# Patient Record
Sex: MALE | Race: WHITE
[De-identification: names, ages, dates, MRNs, and addresses within clinical notes are randomized per-mention and may not be internally consistent; named-entity substitution may affect disease eponyms.]

---

## 2017-02-12 ENCOUNTER — HOSPITAL ENCOUNTER (EMERGENCY)
Dept: HOSPITAL 25 - UCEAST | Age: 37
Discharge: HOME | End: 2017-02-12
Payer: COMMERCIAL

## 2017-02-12 VITALS — DIASTOLIC BLOOD PRESSURE: 84 MMHG | SYSTOLIC BLOOD PRESSURE: 120 MMHG

## 2017-02-12 DIAGNOSIS — F17.210: ICD-10-CM

## 2017-02-12 DIAGNOSIS — B34.9: ICD-10-CM

## 2017-02-12 DIAGNOSIS — J40: ICD-10-CM

## 2017-02-12 DIAGNOSIS — J32.9: Primary | ICD-10-CM

## 2017-02-12 PROCEDURE — 99212 OFFICE O/P EST SF 10 MIN: CPT

## 2017-02-12 PROCEDURE — G0463 HOSPITAL OUTPT CLINIC VISIT: HCPCS

## 2017-02-12 NOTE — UC
Respiratory Complaint HPI





- HPI Summary


HPI Summary: 





The patient comes in today for:





1.  Body aches, cough:





Onset: 2 days ago.  


Palliative/provocative:  Dayquil with questionable help. Nothing helps. 


Quality: Ache  


Region/radiation: Bilateral lower back and legs.


Severity:7/10 though he appears to be more like 4/10


Time: Constant aches, but cough lasts a few seconds. 


Associated symptoms:


   Fevers: no temperature taken.


   Rhinitis:  Green material


   Cough:  Green material.


   Chest pain: None at this time.


   Dyspnea: None. 





*





- History of Current Complaint


Chief Complaint: UCRespiratory


Stated Complaint: URI


Time Seen by Provider: 02/12/17 22:05


Hx Obtained From: Patient





- Allergies/Home Medications


Allergies/Adverse Reactions: 


 Allergies











Allergy/AdvReac Type Severity Reaction Status Date / Time


 


No Known Allergies Allergy   Verified 06/08/16 18:13











Home Medications: 


 Home Medications





Dextromethorphan-Phenylephrine [Daytime Cold & Flu Relief 10-5-325 mg] 1 cap PO 

02/12/17 [History]











PMH/Surg Hx/FS Hx/Imm Hx


Previously Healthy: Yes


Endocrine History Of: 


   Denies: Diabetes, Thyroid Disease, Hyperthyroidism, Hypothyroidism, 

Dyslipidemia


Cardiovascular History Of: 


   Denies: Cardiac Disorders, Hypertension, Pacemaker/ICD, Myocardial Infarction

, Congestive Heart Failure, Atrial Fibrillation, Deep Vein Thrombosis, Bleeding 

Disorders


Respiratory History Of: 


   Denies: COPD, Asthma, Bronchitis, Pneumonia, Pulmonary Embolism


GI/ History Of: 


   Denies: Gastroesophageal Reflux, Ulcer, Gastrointestinal Bleed, Gall Bladder 

Disease, Kidney Stones, Diverticulitis, Renal Disease, Urosepsis


Neurological History Of: 


   Denies: TIA, CVA, Dementia, Seizures, Migraine


Psychological History Of: 


   Denies: Anxiety, Depression, Bipolar Disorder, Schizophrenia, Post Traumatic 

Stress Disorder


Cancer History Of: 


   Denies: Lung Cancer, Colorectal Cancer, Breast Cancer, Prostate Cancer, 

Cervical Cancer


Other History Of: 


   Negative For: HIV, Hepatitis B, Hepatitis C, Anticoagulant Therapy





- Surgical History


Surgical History: Yes


Surgery Procedure, Year, and Place: vastectomy





- Family History


Known Family History: 


   Negative: Cardiac Disease, Hypertension





- Social History


Occupation: Employed Full-time


Lives: With Family


Alcohol Use: None


Substance Use Type: None


Smoking Status (MU): Light Every Day Tobacco Smoker


Type: Cigarettes


Amount Used/How Often: 1/3 pack a day


Length of Time of Smoking/Using Tobacco: 15 years





Review of Systems


Constitutional: Negative


Skin: Negative


Eyes: Negative


ENT: Nasal Discharge


Respiratory: Cough


Cardiovascular: Negative


Gastrointestinal: Negative


Genitourinary: Negative


All Other Systems Reviewed And Are Negative: Yes





Physical Exam


Triage Information Reviewed: Yes


Appearance: Well-Appearing, Well-Nourished


Vital Signs: 


 Initial Vital Signs











Temp  96.1 F   02/12/17 21:22


 


Pulse  97   02/12/17 21:22


 


Resp  18   02/12/17 21:22


 


BP  120/84   02/12/17 21:22


 


Pulse Ox  98   02/12/17 21:22











Vital Signs Reviewed: Yes


Eyes: Positive: Conjunctiva Clear.  Negative: Discharge


ENT: Positive: Hearing grossly normal, Other: - Tenderness to palpation of the 

frontal and maxillary sinuses.  Negative: Pharyngeal erythema, Nasal congestion

, TM bulging, TM dull, TM red, Tonsillar swelling, Tonsillar exudate


Dental: Negative: Gross Decay/Caries @, Dental Fracture @


Neck: Positive: Supple, Nontender, No Lymphadenopathy.  Negative: Nuchal 

Rigidity


Respiratory: Positive: Lungs clear, No respiratory distress, No accessory 

muscle use.  Negative: Crackles, Wheezing


Cardiovascular: Positive: RRR, No Murmur


Abdomen Description: Positive: Nontender, No Organomegaly, Soft.  Negative: 

Distended, Guarding


Musculoskeletal: Positive: Strength Intact, ROM Intact


Neurological: Positive: Alert, Muscle Tone Normal


Psychological: Positive: Age Appropriate Behavior, Consolable


Skin: Negative: rashes, breakdown





UC Diagnostic Evaluation





- Laboratory


O2 Sat by Pulse Oximetry: 98





Respiratory Course/Dx





- Differential Dx/Diagnosis


Provider Diagnoses: Sinusitis.  Bronchitis.  Viral syndrome.





Discharge





- Discharge Plan


Condition: Stable


Disposition: HOME


Patient Education Materials:  Sinusitis (ED), Acute Bronchitis (ED), Viral 

Syndrome (ED)


Referrals: 


No Primary Care Phys,NOPCP [Primary Care Provider] - 1 Week


(Please see your primary care provider in a week to see how well you are doing.

  If you don't have a primary


care provider, please call the physician referral phone line to help you get 

one.  





If you can't get in timely, you can see us until you do.  If you get worse, 

please go to the local ER.)


[facility] PHYSICIAN REFERRAL [Outside]

## 2018-02-26 ENCOUNTER — HOSPITAL ENCOUNTER (EMERGENCY)
Dept: HOSPITAL 25 - UCEAST | Age: 38
Discharge: HOME | End: 2018-02-26
Payer: COMMERCIAL

## 2018-02-26 VITALS — DIASTOLIC BLOOD PRESSURE: 77 MMHG | SYSTOLIC BLOOD PRESSURE: 116 MMHG

## 2018-02-26 DIAGNOSIS — S83.91XA: Primary | ICD-10-CM
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
## 2018-02-26 DIAGNOSIS — F17.210: ICD-10-CM
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
## 2018-02-26 PROCEDURE — G0463 HOSPITAL OUTPT CLINIC VISIT: HCPCS

## 2018-02-26 PROCEDURE — 99212 OFFICE O/P EST SF 10 MIN: CPT

## 2018-02-26 NOTE — UC
Knee Pain HPI





- HPI Summary


HPI Summary: 





About 1.5 weeks ago pt was walking in his driveway and R knee twisted, since 

then he has had medial pain. At first was swollen, now swelling improved. 

Continues to have pain with weight bearing, occasionally has the sensation that 

his joint is "coming apart" and has sharp pain with this. Does not feel 

comfortable pivoting or changing directions while walking.





- History of Current Complaint


Hx Obtained From: Patient


Onset/Duration: Sudden Onset


Severity Initially: Moderate


Severity Currently: Mild


Pain Intensity: 4


Character: Dull, Aching


Aggravating Factor(s): Movement, Weight Bearing, Prolonged Standing


Alleviating Factor(s): Rest


Associated Signs And Symptoms: Positive: Swelling


Able to Bear Weight: Yes





<Spring Reardon - Last Filed: 02/26/18 15:39>





<Zeenat Lance - Last Filed: 02/26/18 16:39>





- History of Current Complaint


Chief Complaint: UCLowerExtremity


Stated Complaint: KNEE INJURY


Time Seen by Provider: 02/26/18 15:17





- Allergies/Home Medications


Allergies/Adverse Reactions: 


 Allergies











Allergy/AdvReac Type Severity Reaction Status Date / Time


 


No Known Allergies Allergy   Verified 02/26/18 15:04











Home Medications: 


 Home Medications





Penicillin VK TAB* [Penicillin  mg Tab*] 500 mg PO TID 02/26/18 [History 

Confirmed 02/26/18]











PMH/Surg Hx/FS Hx/Imm Hx


Previously Healthy: Yes


Other History Of: 


   Negative For: HIV, Hepatitis B, Hepatitis C, Anticoagulant Therapy





- Surgical History


Surgical History: Yes


Surgery Procedure, Year, and Place: vastectomy





- Family History


Known Family History: 


   Negative: Cardiac Disease, Hypertension





- Social History


Occupation: Unemployed


Lives: With Family


Alcohol Use: None


Substance Use Type: None


Smoking Status (MU): Light Every Day Tobacco Smoker


Type: Cigarettes


Amount Used/How Often: 1 pack every 4 days


Length of Time of Smoking/Using Tobacco: 15 years


Have You Smoked in the Last Year: Yes





<Spring Reardon - Last Filed: 02/26/18 15:39>





Review of Systems


Constitutional: Negative


Skin: Negative


Eyes: Negative


ENT: Negative


Respiratory: Negative


Cardiovascular: Negative


Gastrointestinal: Negative


Genitourinary: Negative


Motor: Negative


Neurovascular: Negative


Musculoskeletal: Arthralgia, Decreased ROM


Neurological: Negative


Psychological: Negative


Is Patient Immunocompromised?: No


All Other Systems Reviewed And Are Negative: Yes





<Spring Reardon - Last Filed: 02/26/18 15:39>





Physical Exam


Triage Information Reviewed: Yes


Appearance: Well-Appearing, Well-Nourished


Vital Signs: 


 Initial Vital Signs











Temp  97.8 F   02/26/18 15:00


 


Pulse  86   02/26/18 15:00


 


Resp  16   02/26/18 15:00


 


BP  116/77   02/26/18 15:00


 


Pulse Ox  99   02/26/18 15:00











Vital Signs Reviewed: Yes


Eye Exam: Normal


Eyes: Positive: Conjunctiva Clear


ENT Exam: Normal


ENT: Positive: Normal ENT inspection, Hearing grossly normal, Pharynx normal, 

TMs normal


Respiratory Exam: Normal


Respiratory: Positive: Chest non-tender, Lungs clear, Normal breath sounds, No 

respiratory distress, No accessory muscle use


Cardiovascular Exam: Normal


Cardiovascular: Positive: RRR, No Murmur


Musculoskeletal Exam: Other - Pain in R knee, medial joint line; possible 

laxity with anterior drawer?


Musculoskeletal: Positive: Strength Intact, ROM Intact


Neurological Exam: Normal


Neurological: Positive: Alert


Psychological Exam: Normal


Skin Exam: Normal





<Spring Reardon - Last Filed: 02/26/18 15:39>


Vital Signs: 


 Initial Vital Signs











Temp  97.8 F   02/26/18 15:00


 


Pulse  86   02/26/18 15:00


 


Resp  16   02/26/18 15:00


 


BP  116/77   02/26/18 15:00


 


Pulse Ox  99   02/26/18 15:00














<Zeenat Lance - Last Filed: 02/26/18 16:39>





Diagnostics





- Radiology


  ** No standard instances


Xray Interpretation: No Acute Changes


Radiology Interpretation Completed By: Radiologist





<Spring Reardon - Last Filed: 02/26/18 15:39>





Knee Pain Course/Dx





- Differential Dx/Diagnosis


Provider Diagnoses: R knee sprain





<Spring Reardon - Last Filed: 02/26/18 15:39>





Discharge





<Spring Reardon - Last Filed: 02/26/18 15:39>





<Zeenat Lance - Last Filed: 02/26/18 16:39>





- Discharge Plan


Condition: Stable


Disposition: HOME


Patient Education Materials:  Knee Sprain (ED)


Referrals: 


Denise Asher MD [Medical Doctor] - 


Additional Instructions: 


As we discussed, your symptoms are concerning for internal ligament damage. 

Please see an orthopedist to discuss the possible need for further imaging. In 

the mean time, please try not to bear weight without the knee immobilizer. If 

you twist your knee again without support you could turn a small injury into a 

large one.





You can take ibuprofen or naproxen as needed for pain.





Attestation Statement


User Type: Provider - I was available for consult.  This patient was seen by 

the advanced practice provider.  The patient was not presented to, seen by, or 

examined by me.-Sanya





<Zeenat Lance - Last Filed: 02/26/18 16:39>

## 2018-02-26 NOTE — RAD
HISTORY: Right knee twisting injury



COMPARISONS: None



VIEWS: 4, Frontal, lateral, axial, and oblique views of the right knee



FINDINGS:



BONE DENSITY: Normal.

BONES: There is no displaced fracture. There are well-corticated bone fragments of the

anterior tibial tubercle.

JOINTS: There is no arthropathy. There is no suprapatellar joint effusion or

lipohemarthrosis.

ALIGNMENT: There is no dislocation. 

SOFT TISSUES: Unremarkable.



OTHER FINDINGS: None.



IMPRESSION: 

NO ACUTE OSSEOUS INJURY. WELL-CORTICATED BONE FRAGMENTS OF THE ANTERIOR TIBIAL TUBERCLE

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 IF SYMPTOMS PERSIST,

RECOMMEND REPEAT IMAGING.

## 2018-12-05 ENCOUNTER — HOSPITAL ENCOUNTER (EMERGENCY)
Dept: HOSPITAL 25 - UCEAST | Age: 38
Discharge: HOME | End: 2018-12-05
Payer: COMMERCIAL

## 2018-12-05 VITALS — SYSTOLIC BLOOD PRESSURE: 121 MMHG | DIASTOLIC BLOOD PRESSURE: 86 MMHG

## 2018-12-05 DIAGNOSIS — K52.9: Primary | ICD-10-CM

## 2018-12-05 DIAGNOSIS — F17.210: ICD-10-CM

## 2018-12-05 PROCEDURE — G0463 HOSPITAL OUTPT CLINIC VISIT: HCPCS

## 2018-12-05 PROCEDURE — 99212 OFFICE O/P EST SF 10 MIN: CPT

## 2018-12-05 NOTE — UC
Abdominal Pain Male HPI





- HPI Summary


HPI Summary: 


Onset last night of nausea/vomiting and watery stools.  Reports fever 101 last 

night.  Has not been able to eat or drink much today due to the nausea.  Is 

also complaining of a mild headache. 





- History of Current Complaint


Chief Complaint: UCGI


Stated Complaint: VOMITING


Time Seen by Provider: 12/05/18 17:25


Hx Obtained From: Patient


Onset/Duration: Sudden Onset, Lasting Days - 1 DAY, Still Present


Timing: Constant


Severity Initially: Moderate


Severity Currently: Moderate


Pain Intensity: 5


Pain Scale Used: 0-10 Numeric


Location: Diffuse


Radiates: No


Character: Cramping, Sharp


Aggravating Factor(s): Food


Alleviating Factor(s): Nothing


Associated Signs And Symptoms: Positive: Fever, Decreased Appetite, Nausea, 

Vomiting, Diarrhea.  Negative: Diaphoresis, Urinary Symptoms





- Allergies/Home Medications


Allergies/Adverse Reactions: 


 Allergies











Allergy/AdvReac Type Severity Reaction Status Date / Time


 


No Known Allergies Allergy   Verified 12/05/18 17:05











Home Medications: 


 Home Medications





Acetaminophen 1,300 mg PO 12/05/18 [History]











PMH/Surg Hx/FS Hx/Imm Hx


Previously Healthy: Yes


Other History Of: 


   Negative For: HIV, Hepatitis B, Hepatitis C, Anticoagulant Therapy





- Surgical History


Surgical History: Yes


Surgery Procedure, Year, and Place: vastectomy





- Family History


Known Family History: 


   Negative: Cardiac Disease, Hypertension





- Social History


Alcohol Use: None


Substance Use Type: None


Smoking Status (MU): Light Every Day Tobacco Smoker


Type: Cigarettes


Amount Used/How Often: 1 pack every 4 days


Length of Time of Smoking/Using Tobacco: 15 years


Have You Smoked in the Last Year: Yes





Review of Systems


All Other Systems Reviewed And Are Negative: Yes


Constitutional: Positive: Fever


Respiratory: Positive: Negative


Cardiovascular: Positive: Negative


Gastrointestinal: Positive: Abdominal Pain, Vomiting, Diarrhea, Nausea


Genitourinary: Positive: Negative





Physical Exam


Triage Information Reviewed: Yes


Appearance: Well-Appearing, No Pain Distress, Well-Nourished


Vital Signs: 


 Initial Vital Signs











Temp  97.4 F   12/05/18 17:02


 


Pulse  99   12/05/18 17:02


 


Resp  18   12/05/18 17:02


 


BP  121/86   12/05/18 17:02


 


Pulse Ox  98   12/05/18 17:02











Vital Signs Reviewed: Yes


Eyes: Positive: Conjunctiva Clear


ENT: Positive: Hearing grossly normal, Pharynx normal


Neck: Positive: Supple


Respiratory Exam: Normal


Cardiovascular Exam: Normal


Abdomen Description: Positive: Soft, Other: - DIFFUSELY TENDER BUT WORSE UPPER 

ABDOMEN. NO REBOUND OR RIGIDITY.  Negative: CVA Tenderness (R), CVA Tenderness (

L), Distended, Guarding





Abd Pain Male Course/Dx





- Differential Dx/Clinical Impression


Provider Diagnosis: 


 Gastroenteritis








Discharge





- Sign-Out/Discharge


Documenting (check all that apply): Patient Departure


All imaging exams completed and their final reports reviewed: No Studies





- Discharge Plan


Condition: Stable


Disposition: HOME


Prescriptions: 


Ondansetron ODT TAB* [Zofran Odt TAB*] 4 mg PO Q6H PRN #20 tab.odt


 PRN Reason: Nausea/Vomiting


Patient Education Materials:  Gastroenteritis (ED)


Forms:  *Work Release


Referrals: 


[facility] [Outside] - If Needed


Additional Instructions: 


GASTROENTERITIS:


     You have gastroenteritis ("intestinal flu").  This disease is usually 

caused by a virus.  There is no specific treatment.  The disease will end by 

itself.  For now, the main danger is dehydration.


     Give clear liquids. Examples include Pedialyte, Gatorade, clear broth, 

juices, flat sodas, and jello water. Medications may be prescribed by the 

physician for special cases. Once tolerated, the clear liquid diet may be 

supplemented with rice, cereal, toast, applesauce, or bananas.


     Call the physician or go to the hospital if vomiting increases or blood 

appears in the bowel movement or vomitus; if you fail to improve, or if signs 

of dehydration occur (tongue and mouth become dry, lethargy).





ENSURE ADEQUATE HYDRATION. CLEAR LIQUIDS, BLAND DIET. AVOID CAFFEINE, DAIRY, 

GREASY, SPICY FOODS. ONCE YOU ARE TOLERATING CLEAR LIQUIDS YOU CAN ADVANCE TO 

SIMPLE, BLAND FOODS.








CALL THE NUMBER BELOW FOR ASSISTANCE IN ESTABLISHING WITH A PCP


An additional resource available to assist in finding the appropriate physician 

for your health care needs is the [facility] ([facility]).  

You may contact them by calling [phone].








- Billing Disposition and Condition


Condition: STABLE


Disposition: Home

## 2018-12-08 ENCOUNTER — HOSPITAL ENCOUNTER (EMERGENCY)
Dept: HOSPITAL 25 - ED | Age: 38
LOS: 1 days | Discharge: LEFT BEFORE DISCHARGE | End: 2018-12-09
Payer: COMMERCIAL

## 2018-12-08 ENCOUNTER — HOSPITAL ENCOUNTER (EMERGENCY)
Dept: HOSPITAL 25 - ED | Age: 38
Discharge: HOME | End: 2018-12-08
Payer: COMMERCIAL

## 2018-12-08 VITALS — SYSTOLIC BLOOD PRESSURE: 131 MMHG | DIASTOLIC BLOOD PRESSURE: 101 MMHG

## 2018-12-08 VITALS — SYSTOLIC BLOOD PRESSURE: 126 MMHG | DIASTOLIC BLOOD PRESSURE: 93 MMHG

## 2018-12-08 DIAGNOSIS — F17.210: ICD-10-CM

## 2018-12-08 DIAGNOSIS — F11.23: Primary | ICD-10-CM

## 2018-12-08 DIAGNOSIS — F11.90: Primary | ICD-10-CM

## 2018-12-08 DIAGNOSIS — Z53.21: ICD-10-CM

## 2018-12-08 PROCEDURE — 99283 EMERGENCY DEPT VISIT LOW MDM: CPT

## 2018-12-08 PROCEDURE — 36415 COLL VENOUS BLD VENIPUNCTURE: CPT

## 2018-12-08 PROCEDURE — 86703 HIV-1/HIV-2 1 RESULT ANTBDY: CPT

## 2018-12-08 NOTE — ED
Complex/Multi-Sys Presentation





- HPI Summary


HPI Summary: 





A 39 y/o male brought in by [facility] ambulance presents to [facility] with a chief 

complaint of heroine withdrawal symptoms on 12/08/18. The patient reports 

residing at [facility] where he has been sober for two days. He states that he forgot 

to bring his suboxone from home and has not had food in two days. He usually 

would take 1 suboxone per day. He reports N/V, abd pain, back pain, 

lightheadedness and weakness. He rates his pain as 5/10.





- History Of Current Complaint


Chief Complaint: EDGeneral


Time Seen by Provider: 12/08/18 17:53


Hx Obtained From: Patient, EMS


Onset/Duration: Sudden Onset, Lasting Hours, Still Present


Timing: Constant


Severity Currently: Moderate


Severity Initially: Moderate


Associated Signs And Symptoms: Positive: Weakness, Headache, Nausea, Vomiting, 

Abdominal Pain, Back Pain





- Allergies/Home Medications


Allergies/Adverse Reactions: 


 Allergies











Allergy/AdvReac Type Severity Reaction Status Date / Time


 


No Known Allergies Allergy   Verified 12/05/18 17:05











Home Medications: 


 Home Medications





Buprenorp/Nalox 8-2 MG FILM [Suboxone 8 mg-2 mg Sl Film] 1 film SL DAILY 12/08/ 18 [History Confirmed 12/08/18]











PMH/Surg Hx/FS Hx/Imm Hx


Endocrine/Hematology History: 


   Denies: Hx Anticoagulant Therapy, Hx Diabetes, Hx Thyroid Disease


Cardiovascular History: 


   Denies: Hx Congestive Heart Failure, Hx Deep Vein Thrombosis, Hx Hypertension

, Hx Myocardial Infarction, Hx Pacemaker/ICD


Respiratory History: 


   Denies: Hx Asthma, Hx Chronic Obstructive Pulmonary Disease (COPD), Hx Lung 

Cancer, Hx Pneumonia, Hx Pulmonary Embolism


GI History: 


   Denies: Hx Gall Bladder Disease, Hx Gastrointestinal Bleed, Hx Ulcer, Hx 

Urosepsis


 History: 


   Denies: Hx Kidney Stones, Hx Renal Disease


Neurological History: 


   Denies: Hx Dementia, Hx Migraine, Hx Seizures, Hx Transient Ischemic Attacks 

(TIA)


Psychiatric History: 


   Denies: Hx Anxiety, Hx Depression, Hx Schizophrenia, Hx Bipolar Disorder





- Surgical History


Surgery Procedure, Year, and Place: vastectomy


Infectious Disease History: No


Infectious Disease History: 


   Denies: Hx Hepatitis, Hx Human Immunodeficiency Virus (HIV), History Other 

Infectious Disease, Traveled Outside the [place] in Last 30 Days





- Family History


Known Family History: 


   Negative: Cardiac Disease, Hypertension





- Social History


Alcohol Use: None


Substance Use Type: Reports: None


Substance Use Comment - Amount & Last Used: recovering heroin user


Smoking Status (MU): Light Every Day Tobacco Smoker


Type: Cigarettes


Amount Used/How Often: 1 pack every 4 days


Length of Time of Smoking/Using Tobacco: 15 years


Have You Smoked in the Last Year: Yes





Review of Systems


Positive: Abdominal Pain, Vomiting, Nausea


Positive: Myalgia - back pain


Neurological: Other - Positive: lightheadedness


Positive: Weakness


All Other Systems Reviewed And Are Negative: Yes





Physical Exam





- Summary


Physical Exam Summary: 





Appearance: The patient is well-nourished in no acute distress and in no acute 

pain.


 


Skin: The patient is mildly diaphoretic.





HEENT: The head is normocephalic and atraumatic. The pupils are 4-5 mm. The 

conjunctivae are clear and without drainage. Nares are patent and without 

drainage. Mouth reveals moist mucous membranes and the throat is without 

erythema and exudate. The external ears are intact. The ear canals are patent 

and without drainage. The tympanic membranes are intact.


 


Neck: The neck is supple with full range of motion and non-tender. There are no 

carotid bruits. There is no neck vein distension.


 





Respiratory: Chest is non-tender. Lungs are clear to auscultation and breath 

sounds are symmetrical and equal.


 


Cardiovascular: Heart is regular rate and rhythm. There is no murmur or rub 

auscultated. There is no peripheral edema and pulses are symmetrical and equal.


 


Abdomen: The abdomen is soft and non-tender. There are normal bowel sounds 

heard in all four quadrants and there is no organomegaly palpated.


 


Musculoskeletal: There is no back tenderness noted. Extremities are non-tender 

with full range of motion. There is good capillary refill. There is no 

peripheral edema or calf tenderness elicited.


 


Neurological: Patient is alert and oriented to person, place and time. The 

patient has symmetrical motor strength in all four extremities. Cranial nerves 

are grossly intact. Deep tendon reflexes are symmetrical and equal in all four 

extremities.


 


Psychiatric: The patient has an appropriate affect and does not exhibit any 

anxiety or depression.


Triage Information Reviewed: Yes


Vital Signs On Initial Exam: 


 Initial Vitals











Temp Pulse Resp BP Pulse Ox


 


 99.4 F   93   22   131/86   99 


 


 12/08/18 17:49  12/08/18 17:49  12/08/18 17:49  12/08/18 17:49  12/08/18 17:49











Vital Signs Reviewed: Yes





Diagnostics





- Vital Signs


 Vital Signs











  Temp Pulse Resp BP Pulse Ox


 


 12/08/18 18:13   96  20  135/89  98


 


 12/08/18 17:49  99.4 F  93  22  131/86  99














- Laboratory


Lab Statement: Any lab studies that have been ordered have been reviewed, and 

results considered in the medical decision making process.





Re-Evaluation





- Re-Evaluation


  ** First Eval


Re-Evaluation Time: 20:05


Change: Improved


Comment: Pt ready for discharge.





Complex Multi-Symp Course/Dx


Course Of Treatment: Mr. Boyd Fregoso presented to the emergency department 

with a COWS of 17.  He apparently is court mandated to care at [facility] which was 

outpatient until a couple days ago.  He relapsed to heroin and was made 

inpatient.  Miguelina Perry wrote a prescription for him for Suboxone for 7 days 

which he filled yesterday.  When he went to inpatient he did not think he 

needed to bring his Suboxone with him.  They cannot write him another 

prescription for 7 days.  He was nontoxic in appearance here and his vitals are 

stable.  He was given Suboxone felt much improved.  His wife brought in his 

prescription Suboxone and he is going to take that back to [facility] with him.





- Diagnoses


Provider Diagnoses: 


 Opiate withdrawal








Discharge





- Sign-Out/Discharge


Documenting (check all that apply): Patient Departure - DC





- Discharge Plan


Condition: Stable


Disposition: HOME


Patient Education Materials:  Opioid Withdrawal (ED)


Referrals: 


Jen Perry, NP [Nurse Practitioner] -  (2-3 days)


Additional Instructions: 


Follow up with Jen Perry at [facility]. Return to the ED if you experience any 

new or worsening symptoms.





- Billing Disposition and Condition


Condition: STABLE


Disposition: Home





- Attestation Statements


Document Initiated by Joaquinibe: Yes


Documenting Scribe: Kye Schrader


Provider For Whom Michelle is Documenting (Include Credential): Jose Browne MD


Scribe Attestation: 


I, Kye Schrader, scribed for Jose Browne MD on 12/08/18 at 2049. 


Scribe Documentation Reviewed: Yes


Provider Attestation: 


The documentation as recorded by the Kye hoffman accurately 

reflects the service I personally performed and the decisions made by me, 

Jose Browne MD


Status of Scribe Document: Viewed